# Patient Record
Sex: FEMALE | Race: ASIAN | NOT HISPANIC OR LATINO
[De-identification: names, ages, dates, MRNs, and addresses within clinical notes are randomized per-mention and may not be internally consistent; named-entity substitution may affect disease eponyms.]

---

## 2017-01-05 ENCOUNTER — RECORDS - HEALTHEAST (OUTPATIENT)
Dept: ADMINISTRATIVE | Facility: OTHER | Age: 67
End: 2017-01-05

## 2017-02-06 ENCOUNTER — AMBULATORY - HEALTHEAST (OUTPATIENT)
Dept: CARE COORDINATION | Facility: CLINIC | Age: 67
End: 2017-02-06

## 2017-02-07 ENCOUNTER — COMMUNICATION - HEALTHEAST (OUTPATIENT)
Dept: NURSING | Facility: CLINIC | Age: 67
End: 2017-02-07

## 2017-02-22 ENCOUNTER — OFFICE VISIT - HEALTHEAST (OUTPATIENT)
Dept: EDUCATION SERVICES | Facility: CLINIC | Age: 67
End: 2017-02-22

## 2017-02-22 DIAGNOSIS — E08.65 DIABETES MELLITUS DUE TO UNDERLYING CONDITION WITH HYPERGLYCEMIA (H): ICD-10-CM

## 2017-02-22 LAB — HBA1C MFR BLD: 11.8 % (ref 3.5–6)

## 2017-02-23 ENCOUNTER — COMMUNICATION - HEALTHEAST (OUTPATIENT)
Dept: FAMILY MEDICINE | Facility: CLINIC | Age: 67
End: 2017-02-23

## 2017-03-20 ENCOUNTER — RECORDS - HEALTHEAST (OUTPATIENT)
Dept: ADMINISTRATIVE | Facility: OTHER | Age: 67
End: 2017-03-20

## 2017-04-24 ENCOUNTER — HOSPITAL ENCOUNTER (OUTPATIENT)
Dept: LAB | Age: 67
Setting detail: SPECIMEN
Discharge: HOME | End: 2017-04-24

## 2017-04-24 ENCOUNTER — OFFICE VISIT - HEALTHEAST (OUTPATIENT)
Dept: FAMILY MEDICINE | Facility: CLINIC | Age: 67
End: 2017-04-24

## 2017-04-24 DIAGNOSIS — E78.5 HYPERLIPIDEMIA: ICD-10-CM

## 2017-04-24 DIAGNOSIS — R10.13 ABDOMINAL PAIN, EPIGASTRIC: ICD-10-CM

## 2017-04-24 DIAGNOSIS — M77.11 LATERAL EPICONDYLITIS, RIGHT ELBOW: ICD-10-CM

## 2017-04-24 DIAGNOSIS — E55.9 VITAMIN D DEFICIENCY: ICD-10-CM

## 2017-04-24 DIAGNOSIS — E11.9 TYPE 2 DIABETES MELLITUS (H): ICD-10-CM

## 2017-04-24 LAB
HBA1C MFR BLD: >14 % (ref 3.5–6)
LDLC SERPL CALC-MCNC: 108 MG/DL

## 2017-04-24 RX ORDER — GLUCOSAMINE HCL/CHONDROITIN SU 500-400 MG
1 CAPSULE ORAL 2 TIMES DAILY PRN
Qty: 180 STRIP | Refills: 6 | Status: SHIPPED | OUTPATIENT
Start: 2017-04-24

## 2017-04-24 ASSESSMENT — MIFFLIN-ST. JEOR: SCORE: 1006.24

## 2017-05-08 ENCOUNTER — COMMUNICATION - HEALTHEAST (OUTPATIENT)
Dept: FAMILY MEDICINE | Facility: CLINIC | Age: 67
End: 2017-05-08

## 2017-05-08 DIAGNOSIS — E11.9 TYPE 2 DIABETES MELLITUS (H): ICD-10-CM

## 2017-05-15 ENCOUNTER — RECORDS - HEALTHEAST (OUTPATIENT)
Dept: ADMINISTRATIVE | Facility: OTHER | Age: 67
End: 2017-05-15

## 2017-05-25 ENCOUNTER — OFFICE VISIT - HEALTHEAST (OUTPATIENT)
Dept: FAMILY MEDICINE | Facility: CLINIC | Age: 67
End: 2017-05-25

## 2017-05-25 DIAGNOSIS — Z29.9 PREVENTIVE MEASURE: ICD-10-CM

## 2017-05-25 DIAGNOSIS — Z71.89 ADVANCE DIRECTIVE DISCUSSED WITH PATIENT: ICD-10-CM

## 2017-05-25 ASSESSMENT — MIFFLIN-ST. JEOR: SCORE: 1019.27

## 2017-06-07 ENCOUNTER — COMMUNICATION - HEALTHEAST (OUTPATIENT)
Dept: FAMILY MEDICINE | Facility: CLINIC | Age: 67
End: 2017-06-07

## 2017-08-29 ENCOUNTER — RECORDS - HEALTHEAST (OUTPATIENT)
Dept: GENERAL RADIOLOGY | Facility: CLINIC | Age: 67
End: 2017-08-29

## 2017-08-29 ENCOUNTER — OFFICE VISIT - HEALTHEAST (OUTPATIENT)
Dept: FAMILY MEDICINE | Facility: CLINIC | Age: 67
End: 2017-08-29

## 2017-08-29 DIAGNOSIS — M79.601 PAIN IN RIGHT ARM: ICD-10-CM

## 2017-08-29 DIAGNOSIS — R91.1 LESION OF RIGHT LUNG: ICD-10-CM

## 2017-08-29 DIAGNOSIS — M54.2 NECK PAIN: ICD-10-CM

## 2017-08-29 DIAGNOSIS — E55.9 VITAMIN D DEFICIENCY: ICD-10-CM

## 2017-08-29 DIAGNOSIS — E78.5 HYPERLIPIDEMIA: ICD-10-CM

## 2017-08-29 DIAGNOSIS — M79.601 RIGHT ARM PAIN: ICD-10-CM

## 2017-08-29 DIAGNOSIS — M54.2 CERVICALGIA: ICD-10-CM

## 2017-08-29 LAB
CHOLEST SERPL-MCNC: 176 MG/DL
FASTING STATUS PATIENT QL REPORTED: NO
HBA1C MFR BLD: 8.1 % (ref 3.5–6)
HDLC SERPL-MCNC: 46 MG/DL

## 2017-08-29 ASSESSMENT — MIFFLIN-ST. JEOR: SCORE: 1028.34

## 2017-08-31 ENCOUNTER — COMMUNICATION - HEALTHEAST (OUTPATIENT)
Dept: FAMILY MEDICINE | Facility: CLINIC | Age: 67
End: 2017-08-31

## 2017-08-31 ENCOUNTER — RECORDS - HEALTHEAST (OUTPATIENT)
Dept: GENERAL RADIOLOGY | Facility: CLINIC | Age: 67
End: 2017-08-31

## 2017-08-31 DIAGNOSIS — R91.1 SOLITARY PULMONARY NODULE: ICD-10-CM

## 2017-09-01 ENCOUNTER — COMMUNICATION - HEALTHEAST (OUTPATIENT)
Dept: FAMILY MEDICINE | Facility: CLINIC | Age: 67
End: 2017-09-01

## 2017-09-05 ENCOUNTER — OFFICE VISIT - HEALTHEAST (OUTPATIENT)
Dept: EDUCATION SERVICES | Facility: CLINIC | Age: 67
End: 2017-09-05

## 2017-09-06 ENCOUNTER — COMMUNICATION - HEALTHEAST (OUTPATIENT)
Dept: FAMILY MEDICINE | Facility: CLINIC | Age: 67
End: 2017-09-06

## 2017-10-24 ENCOUNTER — COMMUNICATION - HEALTHEAST (OUTPATIENT)
Dept: FAMILY MEDICINE | Facility: CLINIC | Age: 67
End: 2017-10-24

## 2017-10-24 DIAGNOSIS — E11.9 TYPE 2 DIABETES MELLITUS (H): ICD-10-CM

## 2017-10-29 ENCOUNTER — COMMUNICATION - HEALTHEAST (OUTPATIENT)
Dept: FAMILY MEDICINE | Facility: CLINIC | Age: 67
End: 2017-10-29

## 2017-10-29 DIAGNOSIS — E78.5 HYPERLIPIDEMIA: ICD-10-CM

## 2017-11-02 ENCOUNTER — RECORDS - HEALTHEAST (OUTPATIENT)
Dept: ADMINISTRATIVE | Facility: OTHER | Age: 67
End: 2017-11-02

## 2017-11-28 ENCOUNTER — RECORDS - HEALTHEAST (OUTPATIENT)
Dept: ADMINISTRATIVE | Facility: OTHER | Age: 67
End: 2017-11-28

## 2017-11-28 ENCOUNTER — OFFICE VISIT - HEALTHEAST (OUTPATIENT)
Dept: FAMILY MEDICINE | Facility: CLINIC | Age: 67
End: 2017-11-28

## 2017-11-28 DIAGNOSIS — M54.50 CHRONIC LOWER BACK PAIN: ICD-10-CM

## 2017-11-28 DIAGNOSIS — E78.5 HYPERLIPIDEMIA: ICD-10-CM

## 2017-11-28 DIAGNOSIS — Z29.9 PREVENTIVE MEASURE: ICD-10-CM

## 2017-11-28 DIAGNOSIS — E55.9 VITAMIN D DEFICIENCY: ICD-10-CM

## 2017-11-28 DIAGNOSIS — G89.29 CHRONIC LOWER BACK PAIN: ICD-10-CM

## 2017-11-28 LAB — HBA1C MFR BLD: 7.1 % (ref 3.5–6)

## 2017-11-28 RX ORDER — GLIPIZIDE 10 MG/1
20 TABLET, FILM COATED, EXTENDED RELEASE ORAL DAILY
Qty: 180 TABLET | Refills: 1 | Status: SHIPPED | OUTPATIENT
Start: 2017-11-28

## 2017-11-28 RX ORDER — ACETAMINOPHEN AND CODEINE PHOSPHATE 300; 30 MG/1; MG/1
1 TABLET ORAL 3 TIMES DAILY PRN
Qty: 90 TABLET | Refills: 0 | Status: SHIPPED | OUTPATIENT
Start: 2017-11-28

## 2017-11-28 RX ORDER — PRAVASTATIN SODIUM 20 MG
20 TABLET ORAL AT BEDTIME
Qty: 90 TABLET | Refills: 1 | Status: SHIPPED | OUTPATIENT
Start: 2017-11-28

## 2017-11-28 RX ORDER — GLUCOSAMINE HCL/CHONDROITIN SU 500-400 MG
1 CAPSULE ORAL PRN
Qty: 100 STRIP | Refills: 6 | Status: SHIPPED | OUTPATIENT
Start: 2017-11-28

## 2017-11-29 ENCOUNTER — COMMUNICATION - HEALTHEAST (OUTPATIENT)
Dept: FAMILY MEDICINE | Facility: CLINIC | Age: 67
End: 2017-11-29

## 2017-12-01 ENCOUNTER — COMMUNICATION - HEALTHEAST (OUTPATIENT)
Dept: FAMILY MEDICINE | Facility: CLINIC | Age: 67
End: 2017-12-01

## 2017-12-07 ENCOUNTER — COMMUNICATION - HEALTHEAST (OUTPATIENT)
Dept: FAMILY MEDICINE | Facility: CLINIC | Age: 67
End: 2017-12-07

## 2017-12-29 ENCOUNTER — RECORDS - HEALTHEAST (OUTPATIENT)
Dept: ADMINISTRATIVE | Facility: OTHER | Age: 67
End: 2017-12-29

## 2021-05-30 VITALS — BODY MASS INDEX: 29.12 KG/M2 | HEIGHT: 57 IN | WEIGHT: 135 LBS

## 2021-05-31 VITALS — BODY MASS INDEX: 29.56 KG/M2 | WEIGHT: 137 LBS | HEIGHT: 57 IN

## 2021-05-31 VITALS — WEIGHT: 139 LBS | BODY MASS INDEX: 29.99 KG/M2 | HEIGHT: 57 IN

## 2021-05-31 VITALS — BODY MASS INDEX: 28.74 KG/M2 | WEIGHT: 134 LBS

## 2021-05-31 VITALS — BODY MASS INDEX: 29.65 KG/M2 | WEIGHT: 138.2 LBS

## 2021-06-02 ENCOUNTER — RECORDS - HEALTHEAST (OUTPATIENT)
Dept: ADMINISTRATIVE | Facility: CLINIC | Age: 71
End: 2021-06-02

## 2021-06-02 ENCOUNTER — RECORDS - HEALTHEAST (OUTPATIENT)
Dept: LAB | Facility: CLINIC | Age: 71
End: 2021-06-02

## 2021-06-02 LAB
ANION GAP SERPL CALCULATED.3IONS-SCNC: 9 MMOL/L (ref 5–18)
BUN SERPL-MCNC: 27 MG/DL (ref 8–28)
CALCIUM SERPL-MCNC: 8.5 MG/DL (ref 8.5–10.5)
CHLORIDE BLD-SCNC: 107 MMOL/L (ref 98–107)
CHOLEST SERPL-MCNC: 160 MG/DL
CO2 SERPL-SCNC: 22 MMOL/L (ref 22–31)
CREAT SERPL-MCNC: 0.88 MG/DL (ref 0.6–1.1)
FASTING STATUS PATIENT QL REPORTED: ABNORMAL
GFR SERPL CREATININE-BSD FRML MDRD: >60 ML/MIN/1.73M2
GLUCOSE BLD-MCNC: 128 MG/DL (ref 70–125)
HDLC SERPL-MCNC: 45 MG/DL
LDLC SERPL CALC-MCNC: 88 MG/DL
POTASSIUM BLD-SCNC: 4 MMOL/L (ref 3.5–5)
SODIUM SERPL-SCNC: 138 MMOL/L (ref 136–145)
TRIGL SERPL-MCNC: 133 MG/DL

## 2021-06-08 NOTE — PROGRESS NOTES
Wellness Plan:    Emergency Plan Recommendation:    When to Use the Emergency Department (ED)  An emergency means you could die if you don t get care quickly. Or you could be hurt permanently (disabled). Read below to know when to use -- and when not to use -- an emergency department (also called ED).    Dangers to your life  Here are examples of emergencies. These need immediate care:  A hard time breathing  Severe chest pain  Choking  Severe bleeding  Suddenly not able to move or speak  Blacking out (fainting)`  Poisoning    Dangers of permanent injuries  Here are other emergencies. These also need immediate care:  Deep cuts or severe burns  Broken bones, or sudden severe pain and swelling in a joint    When it s an emergency  If you have an emergency, follow these steps:    1. Go to the nearest emergency department  If you can, go to the hospital ED closest to you right away.  If you cannot get there right away, or if it is not safe to take yourself, call 911 or your police emergency number.  2. Call your primary care doctor  Tell your doctor about the emergency. Call within 24 hours of going to the ED.  If you cannot call, have someone call for you.  Go to your doctor (not the ED) for any follow-up care.    When it s not an emergency  If a problem is not an emergency, follow these steps:    1. Call your primary care doctor  If you don t know the name of your doctor, call your health plan.  If you cannot call, have someone call for you.  2. Follow instructions  Your doctor will tell you what you should do.  You may be told to see your doctor right away. You may be told to go to the ED. Or you may be told to go to an urgent care center.  Follow your doctor s advice.      Diabetes: Sick-Day Plan  Infections, the flu, and even a cold, can cause your blood sugar to rise. And, eating less, nausea, and vomiting may cause your blood glucose to fall (hypoglycemia). Ask your health care provider to help you develop a  sick-day plan. The following information can help.    Call your health care provider if:    You vomit or have diarrhea for more than 6 hours.    Your blood glucose level is higher than usual or over 250 mg/dL after you have taken extra insulin (if recommended in your sick-day plan).    You take oral medicine for diabetes, and your blood sugar is higher than usual or over 250 mg/dL, before a meal and stays that high for more than 24 hours.    Your blood glucose is lower than usual or less than 70 mg/dL    You have moderate to large amounts of ketones in your blood or urine.    You aren t better after 2 days.

## 2021-06-08 NOTE — PROGRESS NOTES
My Clinic Care Coordination Wellness Plan  This Graduation Wellness Plan provides private information in regards to the work I have done with my Care Team from my Primary Care Clinic.  This document provides insight on the goals I have accomplished.  My Care Team congratulates me on my journey to maintain wellness.  This document will help guide me on my journey to maintain a healthy lifestyle.  I will use this to help me overcome any barriers I may encounter.  If I should have any questions or concerns, I will continue to contact the members of my Care Team or contact my Primary Care Clinic for assistance.      [address]  [address]  [phone]    My Preferred Method of Contact:  Phone: [phone]    My Primary/Preferred Language:  Hmong    Preferred Learning Style:  Face to face discussion, Pictures/Diagrams and Hands on teaching    Emergency Contact: Extended Emergency Contact Information  Primary Emergency Contact: Cj Villalobos  Address: [address] [place] [address]  Mobile Phone: [phone]  Relation: Child  Preferred language: English   needed? No  Secondary Emergency Contact: Shannan Villalobos   [place]  Mobile Phone: [phone]  Relation: Child     PCP:  Aline Green MD  Specialists:    Care Team            Aline Green MD PCP - General    [phone]     Ernaitalia Fields [facility] Care Coordinator    [phone]    Jesus Pruitt Psychologist, Psychology    [phone]     DRAKE signed 11/5/15    Simeon Dhillon MD Ophthalmology    [phone]     [facility] Te[facility]     [phone]     Daughter/Emergency Contact; Patient/Family Contact form signed 11/5/15    Nelia Perdomo     [phone]     Preferred ; Agency: N[facility]     [phone]     Son; Patient/Family Contact form signed 11/5/15    [facility]      [phone]     2 days per week        Accomplishments:  Goals       COMPLETED: I have kept the depression symptoms stable so that I continue to score a 4- 5 on a scale of 1 to 10 throughout the last year. (pt-stated)            Personal Plan:  I will continue to meet with my Psychologist, Jesus Pruitt, on a regular basis and attend the [facility].  If I begin to isolate myself, decrease my activity, or rate the depression symptoms above 6 for 2 or more weeks I will schedule an appointment to see Dr. Green or Jesus Pruitt.  I will call the Mental Health Crisis Line for [facility], [phone], if I am needing to talk with someone urgently.        COMPLETED: I was assessed for a [facility] waiver when I turned 65 years old. (pt-stated)            Personal Plan:  I will continue to work with Erna Fields, my [facility] Care Coordinator, and express any needs that I may develop.  If my services end, I move out of [facility], or my medical insurance changes, I will talk with my doctor or Erna in order to receive help.        COMPLETED: medication            1. Take one tablet of glyburide/metformin before first and last meal. 10.29.15    Pill d/c due to no insurance coverage.           COMPLETED: medication            1. Start one tablet metformin 850 mg and one 10 mg tablet of glucotrol Xl daily with breakfast. 12.1.15     Medication was increased to one 850 mg metformin XR bid and glipizide Xl one 10 mg tablet bid today 4.7.16    Reports taking medication as stated above on 4.7.16  met            Advanced Directive/Living Will: The patient has an Advanced Care Directive on file with the clinic.    Clinical Emergency Plans:    When to Use the Emergency Department (ED)  An emergency means you could die if you don t get care quickly. Or you could be hurt permanently (disabled). Read below to know when to use -- and when not to use -- an emergency department (also called  ED).     Dangers to your life  Here are examples of emergencies. These need immediate care:  A hard time breathing  Severe chest pain  Choking  Severe bleeding  Suddenly not able to move or speak  Blacking out (fainting)`  Poisoning     Dangers of permanent injuries  Here are other emergencies. These also need immediate care:  Deep cuts or severe burns  Broken bones, or sudden severe pain and swelling in a joint     When it s an emergency  If you have an emergency, follow these steps:     1. Go to the nearest emergency department  If you can, go to the hospital ED closest to you right away.  If you cannot get there right away, or if it is not safe to take yourself, call 911 or your police emergency number.  2. Call your primary care doctor  Tell your doctor about the emergency. Call within 24 hours of going to the ED.  If you cannot call, have someone call for you.  Go to your doctor (not the ED) for any follow-up care.     When it s not an emergency  If a problem is not an emergency, follow these steps:     1. Call your primary care doctor  If you don t know the name of your doctor, call your health plan.  If you cannot call, have someone call for you.  2. Follow instructions  Your doctor will tell you what you should do.  You may be told to see your doctor right away. You may be told to go to the ED. Or you may be told to go to an urgent care center.  Follow your doctor s advice.      Diabetes: Sick-Day Plan  Infections, the flu, and even a cold, can cause your blood sugar to rise. And, eating less, nausea, and vomiting may cause your blood glucose to fall (hypoglycemia). Ask your health care provider to help you develop a sick-day plan. The following information can help.     Call your health care provider if:    You vomit or have diarrhea for more than 6 hours.    Your blood glucose level is higher than usual or over 250 mg/dL after you have taken extra insulin (if recommended in your sick-day plan).    You take  oral medicine for diabetes, and your blood sugar is higher than usual or over 250 mg/dL, before a meal and stays that high for more than 24 hours.    Your blood glucose is lower than usual or less than 70 mg/dL    You have moderate to large amounts of ketones in your blood or urine.    You aren t better after 2 days.    All [facility] clinic patients have access to a Nurse 24 hours a day, 7 days a week.  If you have questions or want advice from a Nurse, please know [facility] is here for you.  You can call your clinic, [phone], and they will connect you or you can call [facility] at [phone].  [facility] also has Walk In Care clinics in multiple locations.  Call the number listed above for more information about our Walk In Care clinics or visit the [facility] website at [email or web address].

## 2021-06-09 NOTE — PROGRESS NOTES
Assessment: Follow up with Mile and  Guido today, brings in meter, bg noted below.  Last vist bg and a1c improved dramatically, this is not the case today.  Bg quite high, Mile states she has quite the appetite, eating sticky rice, Hmong pancakes and sweet drinks. States when she eats small bowl of rice with lots of vege and lean meat bg are good.  No bg < 250 for the last 10 days.  States it has been some time since she has had a bg in the low 100's like last visit.  Has no desire to make any changes in food choices, portions or amount of food at meals.  Does not want to add or make any changes in diabetes medications.  Instructed on risks of elevated bg, states she is aware of this but is not worried. Feels good and has a lot of energy.     Current dm medications  Metformin 850 mg bid  Glipizide 10 mg bid    Bg:  Fasting      12 noon                     367  303  304  230                     253  357                     358                     299    Recheck of A1c back up to 11.8,had been down to 9.5. Encouraged follow up appt with Dr. Green and VENECIA, states she will have her daughter make appt.     Plan: no further follow scheduled per patient request.     Subjective and Objective:      Mile Villalobos is referred by  for Diabetes Education.     Current diabetes medications:   Metformin 850 mg bid  Glipizide 10 mg bid  Lab Results   Component Value Date    HGBA1C 11.8 (H) 02/22/2017       Goals       monitoring            1. Check fasting bg and either before or after last meal of day. 10.29.15    Did not bring meter to visit, still reporting all bg, instructed to bring meter to all clinic visits.  12.1.15    Meter brought to visit, checking fasting and PC.  met            Follow up:   No follow up planned today per patient request.      Education:     Monitoring   Meter (per above goals): Competent  Monitoring: Assessed and Discussed  BG goals: Assessed and Discussed    Nutrition Management  Nutrition  Management: Assessed and Discussed  Weight: Assessed  Portions/Balance: Discussed  Carb ID/Count: Discussed  Menu Planning: Assessed and Discussed  Dining Out: Assessed and Discussed  Physical Activity: Discussed  Medications: Assessed, Discussed and Competent    Acute Complications: Prevent, Detect, Treat:  Hypoglycemia: Assessed  Hyperglycemia: Assessed and Discussed    Chronic Complications  Foot Care:Discussed  Skin Care: Discussed  Eye: Assessed and Discussed  ABC: Assessed and Discussed  Goal Setting and Problem Solving: Assessed  Barriers: Assessed  Psychosocial Adjustments: Assessed      Time spent with the patient: 30 minutes for diabetes education and counseling.   Previous Education: yes  Visit Type:DSMT  Hours Remaining: DSMT 1.5 and MNT 2      Dorina Robert  2/22/2017

## 2021-06-10 NOTE — PROGRESS NOTES
" Subjective    Mile Villalobos is a 66 y.o. female who presents for right elbow pain and diabetes.    Patient has had pain in her right elbow for 2 months.  It starts in the shoulder and arm.  It hurts worse with internal rotation of the forearm.  No injury.  She thinks it might have started playing competitive pool at adult .  The shoulder pain seems to getting better, but the pain is most persistent at the elbow joint.  It hurts when she flexes and internally rotates.  She got a massage and was told that \"her tendons are tight\".  She has been using Tylenol 500 mg did not help.  Her son is a chiropractor and he taped it.  That also helped.  Ointment helped.  She wants pain medication for it.    She denies any excessive thirst.  Sometimes she urinates more than others.  Her hunger is up.  When she is hungry, her blood sugar goes up.  In the summer, she does more exercise.  She walks to the park.  She does dumbbells at home.  She uses a exercise bike at adult .  Reviewed uncontrolled diabetes.  Discussed with her that insulin is the best treatment for her current A1c level.  She declines insulin.  She is willing to add in more pills.     Objective    Blood pressure 98/60, pulse 72, resp. rate 18, height 4' 9\" (1.448 m), weight 135 lb (61.2 kg), not currently breastfeeding. Body mass index is 29.21 kg/(m^2). Patient reports that she has never smoked. She does not have any smokeless tobacco history on file.    Gen: Alert, no apparent distress.  Heart: Regular rate and rhythm, no murmurs.  Lungs: Clear to auscultation bilaterally, no increased work of breathing.  Abdomen: Soft, non-tender, non-distended, bowel sounds normal.  Extremities: Inspection of the arm is normal.  No deformity, atrophy or hypertrophy.  Shoulder has mildly decreased range of motion in all movements.  No significant tenderness to palpation.  At the elbow there is tenderness of the lateral epicondyle, which reproduces her pain.    Results " for orders placed or performed in visit on 04/24/17   Glycosylated Hemoglobin A1c   Result Value Ref Range    Hemoglobin A1c >14.0 (H) 3.5 - 6.0 %     No results found.        Assessment & Plan      Mile is a 66 y.o. female who is here today for Follow-up (diabetes) and Arm Pain (on the right x 2 months. No injury)      1. Type 2 diabetes, uncontrolled.  Resume medications, adding Januvia.  Continue eating brown rice, manage portion sizes, increase exercise.  Follow-up in 1 month.  2. Lateral epicondylitis.  Treat with naproxen 500 mg twice daily for 2 weeks.  Discussed the importance of taking this with food.  She will also use a forearm splint.  Ice as needed.  Rest.  If symptoms persist, will refer to physical therapy.  She would consider corticosteroid injection if symptoms are not resolving.    1. Type 2 diabetes mellitus  blood glucose test (ONETOUCH ULTRA TEST) strips    generic lancets (ONETOUCH ULTRASOFT)    Glycosylated Hemoglobin A1c    sitaGLIPtin (JANUVIA) 100 MG tablet    Comprehensive Metabolic Panel   2. Hyperlipidemia  LDL Cholesterol, Direct   3. Lateral epicondylitis, right elbow  naproxen (NAPROSYN) 500 MG tablet   4. Abdominal Pain In The Central Upper Belly (Epigastric)     5. Vitamin D deficiency             This transcription uses voice recognition software, which may contain typographical errors.

## 2021-06-10 NOTE — PROGRESS NOTES
"SUBJECTIVE:   Mile Villalobos is a 66 y.o. female who presents for a routine physical exam.     Current concerns include the following:     The patient comes in today because she needs paperwork filled out for her adult care center.  She is overdue for a physical exam.    She states that her epicondylitis is feeling better.  They used an old arm splint that they had at home and she took naproxen.  She did not use any ice.  Symptoms are currently 75% improved.    The patient is aware of her uncontrolled diabetes.  Some days she feels thirsty and urinates a lot.  Sometimes she does not urinate much at all.  Sometimes her vision is really blurry, sometimes it is normal.  She DOES NOT want insulin.  She knows people who went on insulin, and then they had kidney failure, and then went on dialysis.  She attributes the cause of dialysis to insulin.  No interest.  Wants to keep taking pills, improving her diet.  Currently grazes throughout the day.  Does not really eat meals.  She does do some walking, aerobics videos, and light weights.  We had a LONG discussion today about risks of uncontrolled diabetes, including kidney failure.  Discussed that her kidneys are currently being harmed by her high blood sugars.  Discussed vascular and neurologic damage.  Patient states \"I will think about it\".    Review of systems is positive for occasional constipation, managed with stool softeners.  Otherwise negative.    Patient Active Problem List    Diagnosis Date Noted     Uncontrolled type 2 diabetes mellitus without complication, without long-term current use of insulin      Priority: High     Overview Note:     5/25/2017 NOT interested in insulin.       Hyperlipidemia      Priority: High     Advance directive discussed with patient 05/25/2017     Noncompliance with medications 10/12/2016     Overview Note:     Note from pharmacy that patient is not taking pravastatin as prescribed. 9/29/16       Urinary incontinence 05/19/2016     " Chronic lower back pain 01/23/2015     Vitamin D deficiency      Murmurs      Overview Note:     II/VI, RUSB, systolic. Patient declines echo.         Overweight (BMI 25.0-29.9)      Past Medical History:   Diagnosis Date     Dysthymia 12/10/2014      Past Surgical History:   Procedure Laterality Date     [place] VAGINAL HYSTERECTOMY,UTERUS 250 GMS/<      Description: Vaginal Hysterectomy;  Recorded: 03/09/2010;  Comments: for contraception      Current Outpatient Prescriptions   Medication Sig Dispense Refill     acetaminophen-codeine (TYLENOL #3) 300-30 mg per tablet Take 1 tablet by mouth 3 (three) times a day as needed for pain. 90 tablet 3     blood glucose test (ONETOUCH ULTRA TEST) strips Use 1 each As Directed 2 (two) times a day as needed (uncontrolled DM2. A1c >11.). brand per patient's insurance 180 strip 6     blood sugar diagnostic (ONETOUCH ULTRA TEST) Strp Use 1 each As Directed 2 times a day at 6:00 am and 4:00 pm. 100 strip 11     cholecalciferol, vitamin D3, (VITAMIN D3) 2,000 unit Tab Take 1 tablet (2,000 Units total) by mouth daily. 100 tablet 3     diaper,brief,adult,disposable (UNDERGARMENT EXTRA ABSORBENT) Misc Use 1 each As Directed every 2 (two) hours as needed (incontinence). Size small 120 each 3     generic lancets (ONETOUCH ULTRASOFT) USE TWICE DAILY 100 each 3     glipiZIDE (GLUCOTROL) 10 MG 24 hr tablet Take 2 tablets (20 mg total) by mouth daily. Take one 10 mg tablet with first and last meal. 180 tablet 1     incontinence pad, liner, disp (BLADDER CONTROL PADS EX ABSORB) Pads Use 1 each As Directed every 2 (two) hours as needed (urinary incontinence). With wings 120 each 3     metFORMIN (GLUCOPHAGE) 850 MG tablet Take one 850 mg tablet before first and last meal of day. 180 tablet 1     polyethylene glycol (GLYCOLAX) 17 gram/dose powder Take 17 g by mouth 2 (two) times a day as needed. Mix in 8 ounces of liquid. 238 g 3     pravastatin (PRAVACHOL) 20 MG tablet Take 1 tablet (20 mg total)  "by mouth bedtime. 90 tablet 3     sitaGLIPtin (JANUVIA) 100 MG tablet Take 1 tablet (100 mg total) by mouth daily. 90 tablet 1     VITAMIN D2 50,000 unit capsule TAKE 1 CAPSULE BY MOUTH ONCE A WEEK. 12 capsule 0     aspirin 81 MG EC tablet Take 1 tablet (81 mg total) by mouth daily. TXHUA HNUB NOJ 1 LUB PAB TIV THAIV PLAWV NRES THIAB STROKE 90 tablet 3     No current facility-administered medications for this visit.       Allergies   Allergen Reactions     Simvastatin Nausea Only     Discontinue simvastatin. Replace with pravastatin.        Social History     Social History     Marital status:      Spouse name: N/A     Number of children: 5     Years of education: Adult school program     Occupational History     Retired from /      Social History Main Topics     Smoking status: Never Smoker     Smokeless tobacco: Never Used     Alcohol use No     Drug use: No     Sexual activity: Not Currently     Partners: Male     Other Topics Concern     Not on file     Social History Narrative    Son is a chiropractor.      Family History   Problem Relation Age of Onset     No Medical Problems Mother      Other Father 107     Longevity      REVIEW OF SYSTEMS: negative, except as listed in subjective above.    PHYSICAL EXAM:   /74 (Patient Site: Left Arm, Patient Position: Sitting, Cuff Size: Adult Regular)  Pulse 70  Resp 18  Ht 4' 9.25\" (1.454 m)  Wt 137 lb (62.1 kg)  BMI 29.39 kg/m2   History   Smoking Status     Never Smoker   Smokeless Tobacco     Never Used     GENERAL: Alert, NAD.  HEAD: NC/AT.  EARS: Normal canal, pinnae and tympanic membrane.  EYES: PERRL, normal fundi.  NOSE: Normal mucosa.  MOUTH: Upper and lower dentures, normal throat.  NECK: normal thyroid, midline trachea.  BREASTS: no masses, skin changes, nipple discharge or tenderness.  LUNGS: CTA bilaterally, no increased work of breathing.  HEART: 2 out of 6 right upper sternal border, early systole, low " pitch.  ABDOMEN: soft, nt/nd, BS+  : Declined  MSK: No significant deformity.  Foot exam normal.  Intact circulation, no skin lesions, monofilament testing is intact.  SKIN: no atypical lesion or rash.  Solar lentigo  LYMPHATICS: no lymphadenopathy.   PSYCH: normal affect.    No results found for this or any previous visit (from the past 24 hour(s)).  No results found.    ASSESSMENT/PLAN:   1. Cancer screening: The patient declines mammograms.  Colonoscopy is next due in 2021.  2. Osteoporosis: Discussed Calcium, vitamin D, and weight bearing exercise.  The patient declines bone density testing.  3. Discussed maintenance of healthy body weight. The following high BMI interventions were performed this visit: encouragement to exercise and lifestyle education regarding diet.  4. Immunizations reviewed: The patient declines all immunizations including zoster and pneumococcus.  5. Advance directive: On file I have had an Advance Directives discussion with the patient. the patient would still like to be full code.  6. Uncontrolled type 2 diabetes, hemoglobin A1c greater than 14.  The patient will continue her current medications, improve exercise and diet, and follow-up in 3 months.    Mile was seen today for follow-up.    Diagnoses and all orders for this visit:    Preventive measure  -     aspirin 81 MG EC tablet; Take 1 tablet (81 mg total) by mouth daily. TXSALLIEA HNUB NOJ 1 LUB PAB TIV YOLANDAV PLAWV NRRODGER THIAB STROKE    Advance directive discussed with patient  -     Full code

## 2021-06-12 NOTE — PROGRESS NOTES
" Caryl Villalobos is a 66 y.o. female who presents for diabetes follow-up.    The patient has significantly modified her diet and exercise.  She has been exercising every day.  She walks, bikes, and lifts weights every morning.  These changes, she shows significant improvement in her blood sugars.  Her preprandial blood sugars are now ranging between 130 and 170.  She is strongly resistant to insulin, preferring to maximize orals, and avoid insulin.  She declines diabetic eye exam.  She does not like eyedrops.  She figures is no point going to the eye doctor if she is not going to let them dilate her eyes anyway.    Still having right arm pain. Aching - bearable.     Objective    Blood pressure 100/58, pulse 66, resp. rate 18, height 4' 9.25\" (1.454 m), weight 139 lb (63 kg), not currently breastfeeding. Body mass index is 29.82 kg/(m^2). Patient reports that she has never smoked. She has never used smokeless tobacco.    Gen: Alert, no apparent distress.  Heart: Regular rate and rhythm, no murmurs.  Lungs: Clear to auscultation bilaterally, no increased work of breathing.  Abdomen: Soft, non-tender, non-distended, bowel sounds normal.  Extremities: No clubbing, cyanosis, edema.  Monofilament testing is intact.,  Intact circulation.    Results for orders placed or performed in visit on 08/29/17   Glycosylated Hemoglobin A1c   Result Value Ref Range    Hemoglobin A1c 8.1 (H) 3.5 - 6.0 %   Basic Metabolic Panel   Result Value Ref Range    Sodium 135 (L) 136 - 145 mmol/L    Potassium 4.5 3.5 - 5.0 mmol/L    Chloride 100 98 - 107 mmol/L    CO2 27 22 - 31 mmol/L    Anion Gap, Calculation 8 5 - 18 mmol/L    Glucose 119 70 - 125 mg/dL    Calcium 9.5 8.5 - 10.5 mg/dL    BUN 16 8 - 22 mg/dL    Creatinine 0.81 0.60 - 1.10 mg/dL    GFR MDRD Af Amer >60 >60 mL/min/1.73m2    GFR MDRD Non Af Amer >60 >60 mL/min/1.73m2   Thyroid Cascade   Result Value Ref Range    TSH 1.73 0.30 - 5.00 uIU/mL   Microalbumin, Random Urine "   Result Value Ref Range    Microalbumin, Random Urine <0.50 0.00 - 1.99 mg/dL    Creatinine, Urine 15.5 mg/dL    Microalbumin/Creatinine Ratio Random Urine  <=19.9 mg/g   Vitamin D, Total (25-Hydroxy)   Result Value Ref Range    Vitamin D, Total (25-Hydroxy) 16.1 (L) 30.0 - 80.0 ng/mL   Cholesterol, Total   Result Value Ref Range    Cholesterol 176 <=199 mg/dL   HDL Cholesterol   Result Value Ref Range    HDL Cholesterol 46 (L) >=50 mg/dL    Patient Fasting > 8hrs? No      No results found.    Lab Results   Component Value Date    HGBA1C 8.1 (H) 08/29/2017    HGBA1C >14.0 (H) 04/24/2017    HGBA1C 11.8 (H) 02/22/2017     Lab Results   Component Value Date    MICROALBUR <0.50 08/29/2017    LDLCALC 105 07/14/2016    CREATININE 0.81 08/29/2017     Xr Chest Pa And Lateral    Result Date: 9/5/2017  XR CHEST PA AND LATERAL 9/5/2017 2:58 PM INDICATION: Right hilar opacity seen on cervical spine x-ray. COMPARISON: C-spine film 8/29/2017 FINDINGS: Dense nodule seen on the C-spine films correspond to less than 1 cm calcified granuloma in the right upper lobe. Minimal scarring in the right lateral costophrenic angle. Left lung is clear. Heart and pulmonary vascularity are normal. This report was electronically interpreted by: Dr. Roz Dougherty MD ON 09/05/2017 at 15:45    Xr Cervical Spine 2 - 3 Vws    Result Date: 8/30/2017  XR CERVICAL SPINE 2 - 3 VWS 8/29/2017 4:33 PM INDICATION: Neck & Right Arm Pain COMPARISON: None. FINDINGS: Cervical spine visualized from C1 through C6. C7 partially obscured by shoulder artifact. Within this limitation no acute fracture or dislocation is identified. Normal alignment. Mild vertebral body height loss, favored chronic. No aggressive osseous lesions. Minimal degenerative disc disease with loss of disc height. Mild degenerative facet disease. Vague opacity noted in the right suprahilar region, chest x-ray is advised for further characterization. NOTE: ABNORMAL REPORT THE DICTATION ABOVE  DESCRIBES AN ABNORMALITY FOR WHICH FOLLOW-UP IS NEEDED. This report was electronically interpreted by: Dr. Leo George MD ON 08/30/2017 at 09:26      Assessment & Plan      Mile is a 66 y.o. female who is here today for Follow-up (diabetes)      1. Uncontrolled type 2 diabetes with significant improvement in hemoglobin A1c through modification of diet and lifestyle.  A1c remains uncontrolled at 8.1%.  Discussed with patient that I would like to have this below 7 for her.  She is going to see our diabetes educator and bring in all of her medications at that time to go over what she is doing and see if we can optimize her medications a little bit more.  She does not have them with her today.  Labs obtained as below.  2. Neck and arm pain.  Suspect degenerative changes in the cervical spine account for this with some impingement of the nerves in the cervical region.  Degenerative disease noted on x-ray today.  Recommend physical therapy, which the patient declines at this time.  Continue as needed pain medication.  If she decides she wants physical therapy, she should can just call and we will put in referral for.      1. Uncontrolled type 2 diabetes mellitus without complication, without long-term current use of insulin  Glycosylated Hemoglobin A1c    Basic Metabolic Panel    Thyroid Cascade    Microalbumin, Random Urine   2. Hyperlipidemia  Cholesterol, Total    HDL Cholesterol   3. Vitamin D deficiency  Vitamin D, Total (25-Hydroxy)   4. Right arm pain  XR Cervical Spine 2 - 3 VWS   5. Neck pain  XR Cervical Spine 2 - 3 VWS   6. Lesion of right lung  XR Chest PA and Lateral           This transcription uses voice recognition software, which may contain typographical errors.

## 2021-06-12 NOTE — PROGRESS NOTES
Assessment: Follow up with Mile today, last seen 2.2017, comes in with meter bg noted below.   A1c noted at 8.1, last visit > 14.0.    Current dm medication:  Metformin 850 bud  NOT TAKING  Glipizide 10 mg bid NOT TAKING  Januvia 100 mg daily     BG:  Fasting/PC  143  147                193  167                 176  148  145     Saw MD 8.29.17 where similar bg were reported.  Mile states she is only taking the Januvia 100 mg daily, ran out of metformin and glipizide some time ago, has not taken for a while. Spent great deal of time with medication reconciliation, insists she is not taking metformin or glipizide, taking Januvia, tylenol, Vitamin D and her cholesterol medication, this correlates with current medication list.  Difficult to know what Mile is doing, however what ever changes she has made encouraged to continue given above bg, these are the best we have seen for some time.    Eating three meals per day, brown rice with fish and vege, only eating sticky rice on occasion and not as much, very little pop only a few sips, less Hmong corn and no Hmong pancakes.  Change in  Intake has definite impact on bg however question the control with Januvia given past A1c of > 14.0.    Feels better and has more energy, instructed this is partially due to better bg mgmt, happy today with bg/ diabetes mgmt. Wt down 2# and has been trying to be active every day.     Plan: .017, CDE 1.2018    Subjective and Objective:      Mile Villalobos is referred by  for Diabetes Education.     Lab Results   Component Value Date    HGBA1C 8.1 (H) 08/29/2017         Current diabetes medications:    Metformin 850 bud  NOT TAKING  Glipizide 10 mg bid NOT TAKING  Januvia 100 mg daily         Goals       monitoring            1. Check fasting bg and either before or after last meal of day. 10.29.15    Did not bring meter to visit, still reporting all bg, instructed to bring meter to all clinic visits.  12.1.15    Meter brought to visit,  checking fasting and PC.  met            Follow up:   CDE (certified diabetic educator)      Education:     Monitoring   Meter (per above goals): Competent  Monitoring: Discussed  BG goals: Assessed and Discussed    Nutrition Management  Nutrition Management: Discussed  Weight: Assessed  Portions/Balance: Discussed  Carb ID/Count: Discussed  Heart Healthy Fats: Discussed  Menu Planning: Discussed  Physical Activity: Discussed  Medications: Assessed and Discussed  Orals: Assessed and Discussed  I  Acute Complications: Prevent, Detect, Treat:  Hypoglycemia: Assessed  Hyperglycemia: Assessed and Discussed    Chronic Complications  Foot Care:Discussed  Skin Care: Discussed  Eye: refuses  ABC: Assessed and Discussed  Teeth:Discussed  Goal Setting and Problem Solving: Assessed  Barriers: Assessed  Psychosocial Adjustments: Assessed      Time spent with the patient: 60 minutes for diabetes education and counseling.   Previous Education: yes  Visit Type:MNT  Hours Remaining: DSMT 1.5 and MNT 1      Dorina Robert  9/5/2017

## 2021-06-14 NOTE — PROGRESS NOTES
Subjective    Mile Villalobos is a 67 y.o. female who presents for diabetes follow up.    Moving to [place]  The patient is moving to [place] on December 22, 2017.  Her daughter and son-in-law are moving for work, and she is going with them.  They start her job on January 1.  They will be driving out.  She is still looking for housing.  So she does not know what neighborhood she will be in.  Her daughter is going to check out health insurance.  She is really nervous about the move because she has been told that [employer]  is are rare in [place] and has heard stories of discrimination in healthcare.  Apparently, there is a shrinking lung community in [place].  People are moving to [place] to work on chicken [employer], instead.    Diabetes  The patient lost her glucometer in the move.  She cried when she realized that she could not find it.  She thinks it might have gotten boxed up.  She likes been able to check her blood sugars when she does not feel well, to see if her sugar is too high or too low    Declines flu and pneumonia shots.  She feels like she has been well without shots.  Her mother was an herbalist and treated all of their conditions with herbs.  She really does not have any interest in getting the shots done.  Discussed how her risks factors of diabetes and age over 65 put her at increased risk for getting seriously sick from these infections.  She declines     Objective    Blood pressure 118/64, pulse 76, weight 134 lb (60.8 kg), not currently breastfeeding. Body mass index is 28.74 kg/(m^2). Patient reports that she has never smoked. She has never used smokeless tobacco.    Gen: Alert, no apparent distress.  Heart: Regular rate and rhythm, no murmurs.  Lungs: Clear to auscultation bilaterally, no increased work of breathing.  Abdomen: Soft, non-tender, non-distended, bowel sounds normal.  Extremities: No clubbing, cyanosis, edema.  Sensation intact on feet, circulation intact and feet.  No foot sores or  lesions per    Results for orders placed or performed in visit on 11/28/17   Glycosylated Hemoglobin A1c   Result Value Ref Range    Hemoglobin A1c 7.1 (H) 3.5 - 6.0 %     No results found.       Assessment & Plan      Mile is a 67 y.o. female who is here today for Follow-up (3 mon, moving to [place] Dec 22)      1. Type 2 diabetes, uncontrolled.  Hemoglobin A1c goal of less than 7%.  Currently at 7.1%.  Significant improvement since April, when hemoglobin A1c was greater than 14%.  Motivated to avoid insulin.  Due for diabetic eye exam.  Will schedule when she establishes care in [place].  2. Planning for move.  Asked her to work on making sure she has insurance established as first priority, and make an appointment with a primary care clinic that is covered by her insurance as soon as she arrives, to avoid lapse in care.  90 day prescriptions, with one refill, were given of her medications.  I called her pharmacy and they said that if she needs a vacation override, they may be able to give her additional medications to help with her move.  They also think that because she has Medicare, she can get her prescriptions filled in [place], when she gets there as well.  She anticipates that she may come back to [place], in the future.  At that time,  she can reestablish care with me.    1. Uncontrolled type 2 diabetes mellitus without complication, without long-term current use of insulin  Glycosylated Hemoglobin A1c    blood-glucose meter Misc    generic lancets    blood glucose test strips    glipiZIDE (GLUCOTROL XL) 10 MG 24 hr tablet    sitaGLIPtin (JANUVIA) 100 MG tablet    metFORMIN (GLUCOPHAGE) 850 MG tablet   2. Chronic lower back pain  acetaminophen-codeine (TYLENOL #3) 300-30 mg per tablet   3. Preventive measure  aspirin 81 MG EC tablet   4. Vitamin D deficiency  cholecalciferol, vitamin D3, (VITAMIN D3) 2,000 unit Tab   5. Hyperlipidemia  pravastatin (PRAVACHOL) 20 MG tablet       No future appointments.      This transcription uses voice recognition software, which may contain typographical errors.

## 2021-06-15 PROBLEM — Z71.89 ADVANCE DIRECTIVE DISCUSSED WITH PATIENT: Status: ACTIVE | Noted: 2017-05-25

## 2021-06-16 PROBLEM — E87.1 HYPONATREMIA: Status: ACTIVE | Noted: 2017-09-01

## 2021-07-13 ENCOUNTER — RECORDS - HEALTHEAST (OUTPATIENT)
Dept: ADMINISTRATIVE | Facility: CLINIC | Age: 71
End: 2021-07-13

## 2021-07-21 ENCOUNTER — RECORDS - HEALTHEAST (OUTPATIENT)
Dept: ADMINISTRATIVE | Facility: CLINIC | Age: 71
End: 2021-07-21

## 2021-08-21 ENCOUNTER — HEALTH MAINTENANCE LETTER (OUTPATIENT)
Age: 71
End: 2021-08-21

## 2021-10-16 ENCOUNTER — HEALTH MAINTENANCE LETTER (OUTPATIENT)
Age: 71
End: 2021-10-16

## 2022-04-02 ENCOUNTER — HEALTH MAINTENANCE LETTER (OUTPATIENT)
Age: 72
End: 2022-04-02

## 2022-04-29 ENCOUNTER — LAB REQUISITION (OUTPATIENT)
Dept: LAB | Facility: CLINIC | Age: 72
End: 2022-04-29

## 2022-04-29 DIAGNOSIS — I10 ESSENTIAL (PRIMARY) HYPERTENSION: ICD-10-CM

## 2022-04-29 LAB
ALBUMIN SERPL-MCNC: 3.7 G/DL (ref 3.5–5)
ALP SERPL-CCNC: 68 U/L (ref 45–120)
ALT SERPL W P-5'-P-CCNC: 13 U/L (ref 0–45)
ANION GAP SERPL CALCULATED.3IONS-SCNC: 11 MMOL/L (ref 5–18)
AST SERPL W P-5'-P-CCNC: 16 U/L (ref 0–40)
BILIRUB SERPL-MCNC: 0.3 MG/DL (ref 0–1)
BUN SERPL-MCNC: 11 MG/DL (ref 8–28)
CALCIUM SERPL-MCNC: 9.6 MG/DL (ref 8.5–10.5)
CHLORIDE BLD-SCNC: 103 MMOL/L (ref 98–107)
CHOLEST SERPL-MCNC: 127 MG/DL
CO2 SERPL-SCNC: 27 MMOL/L (ref 22–31)
CREAT SERPL-MCNC: 0.78 MG/DL (ref 0.6–1.1)
GFR SERPL CREATININE-BSD FRML MDRD: 81 ML/MIN/1.73M2
GLUCOSE BLD-MCNC: 152 MG/DL (ref 70–125)
HDLC SERPL-MCNC: 53 MG/DL
LDLC SERPL CALC-MCNC: 52 MG/DL
POTASSIUM BLD-SCNC: 4.5 MMOL/L (ref 3.5–5)
PROT SERPL-MCNC: 7.1 G/DL (ref 6–8)
SODIUM SERPL-SCNC: 141 MMOL/L (ref 136–145)
TRIGL SERPL-MCNC: 108 MG/DL

## 2022-04-29 PROCEDURE — 80061 LIPID PANEL: CPT | Performed by: PHYSICIAN ASSISTANT

## 2022-04-29 PROCEDURE — 80053 COMPREHEN METABOLIC PANEL: CPT | Performed by: PHYSICIAN ASSISTANT

## 2022-10-01 ENCOUNTER — HEALTH MAINTENANCE LETTER (OUTPATIENT)
Age: 72
End: 2022-10-01

## 2023-02-04 ENCOUNTER — HEALTH MAINTENANCE LETTER (OUTPATIENT)
Age: 73
End: 2023-02-04

## 2023-04-05 ENCOUNTER — LAB REQUISITION (OUTPATIENT)
Dept: LAB | Facility: CLINIC | Age: 73
End: 2023-04-05

## 2023-04-05 DIAGNOSIS — E78.5 HYPERLIPIDEMIA, UNSPECIFIED: ICD-10-CM

## 2023-04-05 DIAGNOSIS — I10 ESSENTIAL (PRIMARY) HYPERTENSION: ICD-10-CM

## 2023-04-05 LAB
ALBUMIN SERPL BCG-MCNC: 4.1 G/DL (ref 3.5–5.2)
ALP SERPL-CCNC: 71 U/L (ref 35–104)
ALT SERPL W P-5'-P-CCNC: 14 U/L (ref 10–35)
ANION GAP SERPL CALCULATED.3IONS-SCNC: 13 MMOL/L (ref 7–15)
AST SERPL W P-5'-P-CCNC: 17 U/L (ref 10–35)
BILIRUB SERPL-MCNC: 0.4 MG/DL
BUN SERPL-MCNC: 14.6 MG/DL (ref 8–23)
CALCIUM SERPL-MCNC: 9.1 MG/DL (ref 8.8–10.2)
CHLORIDE SERPL-SCNC: 97 MMOL/L (ref 98–107)
CHOLEST SERPL-MCNC: 130 MG/DL
CREAT SERPL-MCNC: 0.67 MG/DL (ref 0.51–0.95)
DEPRECATED HCO3 PLAS-SCNC: 21 MMOL/L (ref 22–29)
GFR SERPL CREATININE-BSD FRML MDRD: >90 ML/MIN/1.73M2
GLUCOSE SERPL-MCNC: 450 MG/DL (ref 70–99)
HDLC SERPL-MCNC: 58 MG/DL
LDLC SERPL CALC-MCNC: 55 MG/DL
NONHDLC SERPL-MCNC: 72 MG/DL
POTASSIUM SERPL-SCNC: 4.3 MMOL/L (ref 3.4–5.3)
PROT SERPL-MCNC: 6.7 G/DL (ref 6.4–8.3)
SODIUM SERPL-SCNC: 131 MMOL/L (ref 136–145)
TRIGL SERPL-MCNC: 84 MG/DL

## 2023-04-05 PROCEDURE — 80053 COMPREHEN METABOLIC PANEL: CPT | Performed by: PHYSICIAN ASSISTANT

## 2023-04-05 PROCEDURE — 80061 LIPID PANEL: CPT | Performed by: PHYSICIAN ASSISTANT

## 2023-07-25 ENCOUNTER — LAB REQUISITION (OUTPATIENT)
Dept: LAB | Facility: CLINIC | Age: 73
End: 2023-07-25

## 2023-07-25 DIAGNOSIS — E11.65 TYPE 2 DIABETES MELLITUS WITH HYPERGLYCEMIA (H): ICD-10-CM

## 2023-07-25 PROCEDURE — 82570 ASSAY OF URINE CREATININE: CPT | Performed by: PHYSICIAN ASSISTANT

## 2023-07-25 PROCEDURE — 80053 COMPREHEN METABOLIC PANEL: CPT | Performed by: PHYSICIAN ASSISTANT

## 2023-07-26 LAB
ALBUMIN SERPL BCG-MCNC: 4.2 G/DL (ref 3.5–5.2)
ALP SERPL-CCNC: 62 U/L (ref 35–104)
ALT SERPL W P-5'-P-CCNC: 14 U/L (ref 0–50)
ANION GAP SERPL CALCULATED.3IONS-SCNC: 11 MMOL/L (ref 7–15)
AST SERPL W P-5'-P-CCNC: 17 U/L (ref 0–45)
BILIRUB SERPL-MCNC: 0.4 MG/DL
BUN SERPL-MCNC: 13.4 MG/DL (ref 8–23)
CALCIUM SERPL-MCNC: 9.8 MG/DL (ref 8.8–10.2)
CHLORIDE SERPL-SCNC: 96 MMOL/L (ref 98–107)
CREAT SERPL-MCNC: 0.82 MG/DL (ref 0.51–0.95)
CREAT UR-MCNC: 17.5 MG/DL
DEPRECATED HCO3 PLAS-SCNC: 28 MMOL/L (ref 22–29)
GFR SERPL CREATININE-BSD FRML MDRD: 76 ML/MIN/1.73M2
GLUCOSE SERPL-MCNC: 284 MG/DL (ref 70–99)
MICROALBUMIN UR-MCNC: <12 MG/L
MICROALBUMIN/CREAT UR: NORMAL MG/G{CREAT}
POTASSIUM SERPL-SCNC: 4.2 MMOL/L (ref 3.4–5.3)
PROT SERPL-MCNC: 7 G/DL (ref 6.4–8.3)
SODIUM SERPL-SCNC: 135 MMOL/L (ref 136–145)

## 2023-08-06 ENCOUNTER — HEALTH MAINTENANCE LETTER (OUTPATIENT)
Age: 73
End: 2023-08-06

## 2023-10-09 ENCOUNTER — OFFICE VISIT (OUTPATIENT)
Dept: PHARMACY | Facility: PHYSICIAN GROUP | Age: 73
End: 2023-10-09
Payer: COMMERCIAL

## 2023-10-09 VITALS — DIASTOLIC BLOOD PRESSURE: 69 MMHG | HEART RATE: 71 BPM | SYSTOLIC BLOOD PRESSURE: 122 MMHG

## 2023-10-09 DIAGNOSIS — E11.65 TYPE 2 DIABETES MELLITUS WITH HYPERGLYCEMIA, WITHOUT LONG-TERM CURRENT USE OF INSULIN (H): Primary | ICD-10-CM

## 2023-10-09 DIAGNOSIS — I10 ESSENTIAL HYPERTENSION: ICD-10-CM

## 2023-10-09 DIAGNOSIS — E78.5 HYPERLIPIDEMIA, UNSPECIFIED HYPERLIPIDEMIA TYPE: ICD-10-CM

## 2023-10-09 PROCEDURE — 99607 MTMS BY PHARM ADDL 15 MIN: CPT | Performed by: PHARMACIST

## 2023-10-09 PROCEDURE — 99605 MTMS BY PHARM NP 15 MIN: CPT | Performed by: PHARMACIST

## 2023-10-09 NOTE — LETTER
"Recommended To-Do List      Prepared on: 10/11/2023       You can get the best results from your medications by completing the items on this \"To-Do List.\"      Bring your To-Do List when you go to your doctor. And, share it with your family or caregivers.    My To-Do List:  What we talked about: What I should do:   The importance of taking your medication as intended    Reminder to take your medication as prescribed for glipiZIDE (GLUCOTROL). Call me (Yadira) at [phone] and leave a message with names of what you are taking and what you are out of.           What we talked about: What I should do:                     "

## 2023-10-09 NOTE — LETTER
Medication List        Prepared on: 10/11/2023     Bring your Medication List when you go to the doctor, hospital, or   emergency room. And, share it with your family or caregivers.     Note any changes to how you take your medications.  Cross out medications when you no longer use them.    Medication How I take it Why I use it Prescriber   empagliflozin (JARDIANCE) 10 MG TABS tablet Take 10 mg by mouth daily Type 2 Diabetes Jeri Lundberg PA-C   glipiZIDE (GLUCOTROL) 10 MG tablet Take 10 mg by mouth every morning (before breakfast) Type 2 Diabetes Jeri Lundberg PA-C   lisinopril (ZESTRIL) 5 MG tablet Take 5 mg by mouth daily High Blood Pressure Disorder Jeri Lundberg PA-C   rosuvastatin (CRESTOR) 10 MG tablet Take 10 mg by mouth daily Cholesterol, Heart Jeri Lundberg PA-C         Add new medications, over-the-counter drugs, herbals, vitamins, or  minerals in the blank rows below.    Medication How I take it Why I use it Prescriber                                      Allergies:      No Known Allergies        Side effects I have had:               Other Information:              My notes and questions:

## 2023-10-09 NOTE — PATIENT INSTRUCTIONS
"Recommendations from today's MTM visit:                                                         Call me (Yadira) at [phone] and leave a message with names of what you are taking and what you are out of.     Or email [email or web address]    Follow-up: Due for 3 month diabetes check with Jeri in NOVEMBER    It was great speaking with you today.  I value your experience and would be very thankful for your time in providing feedback in our clinic survey. In the next few days, you may receive an email or text message from [facility] with a link to a survey related to your  clinical pharmacist.\"     To schedule another MTM appointment, please call [phone].    My Clinical Pharmacist's contact information:                                                      Please feel free to contact me with any questions or concerns you have.      Yadira Wayne, PharmD, BCACP  Medication Therapy Management Pharmacist  [facility]  Voicemail: [phone]         "

## 2023-10-09 NOTE — PROGRESS NOTES
Medication Therapy Management (MTM) Encounter    ASSESSMENT:                            Medication Adherence/Access:   Unclear what medications she has been taking. Need verification with pill bottles. She would benefit from ongoing MTM follow-up to help with adherence.     Diabetes:   A1c is not at goal of <8%. Home blood sugars are unclear based on report.   Need to confirm what she is taking as current medication list does not align with pharmacy refill history. Pharmacy most recently filled Jardiance and glipizide. No recent fill of Rybelsus. Concerned about her ability to consistently take Rybelsus to maximize absorption and minimize risks of adverse effects with intermittent dosing.   Based on high A1c she would benefit from basal insulin but continues to refuse this.   Appears her A1c was better controlled when she was on metformin so may benefit from resuming low dose of this.   Also if she is taking immediate-release glipizide she may benefit from changing this to extended-release formulation.     Hypertension:  Blood pressure is at goal of <140/90 mmHg. Renal labs reviewed and within normal limits.     Hyperlipidemia:  Stable. She is prescribed moderate intensity statin.     PLAN:                            Call me (Yadira) at [phone] and leave a message with names of what you are taking and what you are out of.     Or email [email or web address]    Update After Visit: Confirmed list that she is taking-  1. Rosuvastatin, 10 MG tabs, QTY: 90; they are small red/pinkish pills with the letter D on one side of the pill and G on the other side.    2. Lisinopril, 5 MG tabs, QTY: 90; for her HBP; they are small tan/pinkish pills with a 5 on one side.    3. Jardiance, 10 MG tabs, QTY: 30, for her Blood Sugar; are medium size light yellow/tan pills with S10 on one side and the other side with a picture similar to a house.    4. Glipizide, 10 MG tabs, QTY: 90, for her Blood Sugar; they are medium size  white pills with APO on one side and the other side with GLP/10.    Needs refills on all of these.      Recommendations based on this:   Continue Jardiance. Do not think we should increase dose due to her lower blood pressures.   Her blood sugar was better controlled when she was taking metformin. It seems like she did not tolerate higher doses. We could use a combination medication with DPP-4 inhibitor plus extended-release metformin (Janumet XR  mg once daily) to see if that is better tolerated.   Change glipizide to ER 10 mg tablet.     Follow-up: Due for 3 month diabetes check with Jeri in NOVEMBER    SUBJECTIVE/OBJECTIVE:                          Mile Villalobos is a 72 year old female coming in for an initial visit. She was referred to me from Jeri Lundberg PA-C. Patient was accompanied by adult  staff. Patient seen with [facility] phone .     Reason for visit: Diabetes medication management.    Allergies/ADRs: Reviewed in chart  Past Medical History: Reviewed in chart  Tobacco: She reports that she has never smoked. She has never used smokeless tobacco.  Alcohol: none  Activity: participates in adult  program    Medication Adherence/Access:   She did not bring in pill bottles today. She does not know the names of what she is taking. She gets help from adult  staff with picking up refills but self-manages her medications. She does not want to use insulin due to fear her friends  after taking it.     Diabetes       Does not know what medicines she is taking. Rybelsus is on her list but she does not recognize the tablet when shown and did not bring in her pill bottles. Per pharmacy refill history last filled Jardiance 10 mg but she does not recognize this tablet when shown.   She has had friends who  because they took insulin so she does not want to take this.   Not taking aspirin due to age  Patient is not experiencing side effects with current medicines. She has had side  effects to other medicines in the past.   Blood sugar monitorin time(s) daily;   Ranges: (patient reported) 118 last night, 200 night before   130 lowest   Over 200 is highest  Current diabetes symptoms: none, she does not feel bad when her sugar is high  Diet/Exercise: eats meals provided at adult  center, on weekends she eats rice and fruit, eats small snacks mostly not large meals, drinks mostly water  A1c: 2023- 13.8%     Eye exam in the last 12 months? No    Foot exam: up to date  Urine Albumin:   Lab Results   Component Value Date    UMALCR  2023      Comment:      Unable to calculate, urine albumin and/or urine creatinine is outside detectable limits.  Microalbuminuria is defined as an albumin:creatinine ratio of 17 to 299 for males and 25 to 299 for females. A ratio of albumin:creatinine of 300 or higher is indicative of overt proteinuria.  Due to biologic variability, positive results should be confirmed by a second, first-morning random or 24-hour timed urine specimen. If there is discrepancy, a third specimen is recommended. When 2 out of 3 results are in the microalbuminuria range, this is evidence for incipient nephropathy and warrants increased efforts at glucose control, blood pressure control, and institution of therapy with an angiotensin-converting-enzyme (ACE) inhibitor (if the patient can tolerate it).        Creatinine   Date Value Ref Range Status   2023 0.82 0.51 - 0.95 mg/dL Final     GFR Estimate   Date Value Ref Range Status   2023 76 >60 mL/min/1.73m2 Final       Hypertension     Lisinopril 5 mg once daily- on medication list  Patient reports no current medication side effects.  Patient does not self-monitor blood pressure.           Hyperlipidemia     Rosuvastatin 10 mg daily  Patient reports no significant myalgias or other side effects. She did have leg pain with atorvastatin.   The 10-year ASCVD risk score (Yanet DK, et al., 2019) is: 18%    Values  used to calculate the score:      Age: 72 years      Sex: Female      Is Non- : No      Diabetic: Yes      Tobacco smoker: No      Systolic Blood Pressure: 122 mmHg      Is BP treated: No      HDL Cholesterol: 58 mg/dL      Total Cholesterol: 130 mg/dL       Recent Labs   Lab Test 04/05/23  1009 04/29/22  1303   CHOL 130 127   HDL 58 53   LDL 55 52   TRIG 84 108       Today's Vitals: /69 (BP Location: Left arm, Patient Position: Sitting, Cuff Size: Adult Regular)   Pulse 71   ----------------      I spent 45 minutes with this patient today. All changes were made via collaborative practice agreement with Jeri Lundberg PA-C. A copy of the visit note was provided to the patient's provider(s).    A summary of these recommendations was given to the patient and was mailed to the patient.    Yadira Wayne, PharmD, BCACP  Medication Therapy Management Pharmacist  [facility]  Pager: [phone]     Medication Therapy Recommendations  Type 2 diabetes mellitus with hyperglycemia, without long-term current use of insulin (H)    Current Medication: glipiZIDE (GLUCOTROL) 10 MG tablet   Rationale: Does not understand instructions - Adherence - Adherence   Recommendation: Provide Adherence Intervention   Status: Patient Agreed - Adherence/Education

## 2023-10-09 NOTE — LETTER
October 11, 2023  Mile CAMILLE Griselda  [address]    [place] [address]    Dear Ms. Griselda, [facility]     Thank you for talking with me on Oct 9, 2023 about your health and medications. As a follow-up to our conversation, I have included two documents:      Your Recommended To-Do List has steps you should take to get the best results from your medications.  Your Medication List will help you keep track of your medications and how to take them.    If you want to talk about these documents, please call Yadira Wayne RPH at phone: [phone], Monday-Friday 8-4:30pm.    I look forward to working with you and your doctors to make sure your medications work well for you.    Sincerely,  Yadira Wayne RPH  [facility] Pharmacist, [facility]

## 2023-10-11 RX ORDER — LISINOPRIL 5 MG/1
5 TABLET ORAL DAILY
COMMUNITY

## 2023-10-11 RX ORDER — ROSUVASTATIN CALCIUM 10 MG/1
10 TABLET, COATED ORAL DAILY
COMMUNITY

## 2023-10-11 RX ORDER — GLIPIZIDE 10 MG/1
10 TABLET ORAL
COMMUNITY

## 2023-10-15 ENCOUNTER — HEALTH MAINTENANCE LETTER (OUTPATIENT)
Age: 73
End: 2023-10-15

## 2024-03-03 ENCOUNTER — HEALTH MAINTENANCE LETTER (OUTPATIENT)
Age: 74
End: 2024-03-03

## 2024-06-05 ENCOUNTER — LAB REQUISITION (OUTPATIENT)
Dept: LAB | Facility: CLINIC | Age: 74
End: 2024-06-05

## 2024-06-05 DIAGNOSIS — E11.65 TYPE 2 DIABETES MELLITUS WITH HYPERGLYCEMIA (H): ICD-10-CM

## 2024-06-05 PROCEDURE — 80061 LIPID PANEL: CPT | Performed by: PHYSICIAN ASSISTANT

## 2024-06-05 PROCEDURE — 80053 COMPREHEN METABOLIC PANEL: CPT | Performed by: PHYSICIAN ASSISTANT

## 2024-06-05 PROCEDURE — 82570 ASSAY OF URINE CREATININE: CPT | Performed by: PHYSICIAN ASSISTANT

## 2024-06-06 LAB
ALBUMIN SERPL BCG-MCNC: 4.1 G/DL (ref 3.5–5.2)
ALP SERPL-CCNC: 98 U/L (ref 40–150)
ALT SERPL W P-5'-P-CCNC: 15 U/L (ref 0–50)
ANION GAP SERPL CALCULATED.3IONS-SCNC: 11 MMOL/L (ref 7–15)
AST SERPL W P-5'-P-CCNC: 13 U/L (ref 0–45)
BILIRUB SERPL-MCNC: 0.3 MG/DL
BUN SERPL-MCNC: 15.1 MG/DL (ref 8–23)
CALCIUM SERPL-MCNC: 9.1 MG/DL (ref 8.8–10.2)
CHLORIDE SERPL-SCNC: 98 MMOL/L (ref 98–107)
CHOLEST SERPL-MCNC: 165 MG/DL
CREAT SERPL-MCNC: 0.72 MG/DL (ref 0.51–0.95)
DEPRECATED HCO3 PLAS-SCNC: 24 MMOL/L (ref 22–29)
EGFRCR SERPLBLD CKD-EPI 2021: 88 ML/MIN/1.73M2
FASTING STATUS PATIENT QL REPORTED: ABNORMAL
FASTING STATUS PATIENT QL REPORTED: NORMAL
GLUCOSE SERPL-MCNC: 499 MG/DL (ref 70–99)
HDLC SERPL-MCNC: 50 MG/DL
LDLC SERPL CALC-MCNC: 92 MG/DL
NONHDLC SERPL-MCNC: 115 MG/DL
POTASSIUM SERPL-SCNC: 4.6 MMOL/L (ref 3.4–5.3)
PROT SERPL-MCNC: 6.9 G/DL (ref 6.4–8.3)
SODIUM SERPL-SCNC: 133 MMOL/L (ref 135–145)
TRIGL SERPL-MCNC: 113 MG/DL

## 2024-06-07 LAB
CREAT UR-MCNC: 18.4 MG/DL
MICROALBUMIN UR-MCNC: <12 MG/L
MICROALBUMIN/CREAT UR: NORMAL MG/G{CREAT}

## 2024-09-28 ENCOUNTER — HEALTH MAINTENANCE LETTER (OUTPATIENT)
Age: 74
End: 2024-09-28

## 2024-12-07 ENCOUNTER — HEALTH MAINTENANCE LETTER (OUTPATIENT)
Age: 74
End: 2024-12-07

## 2025-04-01 ENCOUNTER — LAB REQUISITION (OUTPATIENT)
Dept: LAB | Facility: CLINIC | Age: 75
End: 2025-04-01

## 2025-04-01 DIAGNOSIS — E11.9 TYPE 2 DIABETES MELLITUS WITHOUT COMPLICATIONS (H): ICD-10-CM

## 2025-04-01 PROCEDURE — 84155 ASSAY OF PROTEIN SERUM: CPT | Performed by: PHYSICIAN ASSISTANT

## 2025-04-01 PROCEDURE — 82043 UR ALBUMIN QUANTITATIVE: CPT | Performed by: PHYSICIAN ASSISTANT

## 2025-04-01 PROCEDURE — 80051 ELECTROLYTE PANEL: CPT | Performed by: PHYSICIAN ASSISTANT

## 2025-04-01 PROCEDURE — 82465 ASSAY BLD/SERUM CHOLESTEROL: CPT | Performed by: PHYSICIAN ASSISTANT

## 2025-04-01 PROCEDURE — 82570 ASSAY OF URINE CREATININE: CPT | Performed by: PHYSICIAN ASSISTANT

## 2025-04-02 LAB
ALBUMIN SERPL BCG-MCNC: 3.8 G/DL (ref 3.5–5.2)
ALP SERPL-CCNC: 89 U/L (ref 40–150)
ALT SERPL W P-5'-P-CCNC: 13 U/L (ref 0–50)
ANION GAP SERPL CALCULATED.3IONS-SCNC: 10 MMOL/L (ref 7–15)
AST SERPL W P-5'-P-CCNC: 13 U/L (ref 0–45)
BILIRUB SERPL-MCNC: 0.3 MG/DL
BUN SERPL-MCNC: 26.5 MG/DL (ref 8–23)
CALCIUM SERPL-MCNC: 9 MG/DL (ref 8.8–10.4)
CHLORIDE SERPL-SCNC: 98 MMOL/L (ref 98–107)
CHOLEST SERPL-MCNC: 140 MG/DL
CREAT SERPL-MCNC: 0.74 MG/DL (ref 0.51–0.95)
CREAT UR-MCNC: 36.6 MG/DL
EGFRCR SERPLBLD CKD-EPI 2021: 84 ML/MIN/1.73M2
FASTING STATUS PATIENT QL REPORTED: ABNORMAL
FASTING STATUS PATIENT QL REPORTED: NORMAL
GLUCOSE SERPL-MCNC: 460 MG/DL (ref 70–99)
HCO3 SERPL-SCNC: 23 MMOL/L (ref 22–29)
HDLC SERPL-MCNC: 64 MG/DL
LDLC SERPL CALC-MCNC: 51 MG/DL
MICROALBUMIN UR-MCNC: <12 MG/L
MICROALBUMIN/CREAT UR: NORMAL MG/G{CREAT}
NONHDLC SERPL-MCNC: 76 MG/DL
POTASSIUM SERPL-SCNC: 4.6 MMOL/L (ref 3.4–5.3)
PROT SERPL-MCNC: 6.7 G/DL (ref 6.4–8.3)
SODIUM SERPL-SCNC: 131 MMOL/L (ref 135–145)
TRIGL SERPL-MCNC: 124 MG/DL

## 2025-04-06 ENCOUNTER — HEALTH MAINTENANCE LETTER (OUTPATIENT)
Age: 75
End: 2025-04-06

## 2025-09-02 ENCOUNTER — LAB REQUISITION (OUTPATIENT)
Dept: LAB | Facility: CLINIC | Age: 75
End: 2025-09-02

## 2025-09-02 DIAGNOSIS — E11.65 TYPE 2 DIABETES MELLITUS WITH HYPERGLYCEMIA (H): ICD-10-CM

## 2025-09-02 PROCEDURE — 82947 ASSAY GLUCOSE BLOOD QUANT: CPT | Performed by: PHYSICIAN ASSISTANT

## 2025-09-03 LAB
ALBUMIN SERPL BCG-MCNC: 4 G/DL (ref 3.5–5.2)
ALP SERPL-CCNC: 71 U/L (ref 40–150)
ALT SERPL W P-5'-P-CCNC: 17 U/L (ref 0–50)
ANION GAP SERPL CALCULATED.3IONS-SCNC: 11 MMOL/L (ref 7–15)
AST SERPL W P-5'-P-CCNC: 16 U/L (ref 0–45)
BILIRUB SERPL-MCNC: 0.3 MG/DL
BUN SERPL-MCNC: 14.9 MG/DL (ref 8–23)
CALCIUM SERPL-MCNC: 9.4 MG/DL (ref 8.8–10.4)
CHLORIDE SERPL-SCNC: 97 MMOL/L (ref 98–107)
CREAT SERPL-MCNC: 0.77 MG/DL (ref 0.51–0.95)
EGFRCR SERPLBLD CKD-EPI 2021: 80 ML/MIN/1.73M2
GLUCOSE SERPL-MCNC: 497 MG/DL (ref 70–99)
HCO3 SERPL-SCNC: 24 MMOL/L (ref 22–29)
POTASSIUM SERPL-SCNC: 4.5 MMOL/L (ref 3.4–5.3)
PROT SERPL-MCNC: 6.7 G/DL (ref 6.4–8.3)
SODIUM SERPL-SCNC: 132 MMOL/L (ref 135–145)